# Patient Record
Sex: FEMALE | Race: OTHER | Employment: UNEMPLOYED | URBAN - METROPOLITAN AREA
[De-identification: names, ages, dates, MRNs, and addresses within clinical notes are randomized per-mention and may not be internally consistent; named-entity substitution may affect disease eponyms.]

---

## 2017-02-07 ENCOUNTER — TELEPHONE (OUTPATIENT)
Dept: OBGYN CLINIC | Facility: CLINIC | Age: 32
End: 2017-02-07

## 2017-02-07 NOTE — TELEPHONE ENCOUNTER
PT INFORMED INS. COMING UP INELIGIBLE. INFORMED PT PER MY SUPERVISOR, SHE WOULD NEED TO BE SELF PAY TODAY AND THAT SHE NEEDS TO CONTACT INSURANCE. PT DID NOT WANT TO PAY AND CANCELLED APPOINTMENT. WILL CALL TO R/S ONCE INSURANCE ISSUE IS FIGURED OUT.

## 2018-12-13 ENCOUNTER — OFFICE VISIT (OUTPATIENT)
Dept: OBGYN CLINIC | Facility: CLINIC | Age: 33
End: 2018-12-13

## 2018-12-13 VITALS
BODY MASS INDEX: 22 KG/M2 | WEIGHT: 122.38 LBS | DIASTOLIC BLOOD PRESSURE: 86 MMHG | SYSTOLIC BLOOD PRESSURE: 120 MMHG | HEART RATE: 114 BPM

## 2018-12-13 DIAGNOSIS — Z30.432 ENCOUNTER FOR IUD REMOVAL: Primary | ICD-10-CM

## 2018-12-13 PROCEDURE — 99213 OFFICE O/P EST LOW 20 MIN: CPT | Performed by: OBSTETRICS & GYNECOLOGY

## 2018-12-13 NOTE — PROGRESS NOTES
HPI:   Nnamdi Rivera is a 35year old female who presents for an iud removal, pt counseled extensively, all questions answered.        Wt Readings from Last 6 Encounters:  12/13/18 : 122 lb 6.4 oz (55.5 kg)  12/20/16 : 135 lb (61.2 kg)  11/04/16 : 149 lb auscultation  CARDIO: RRR without murmur  GI: good BS's,no masses, HSM or tenderness  :introitus is normal,scant discharge,cervix is pink,no adnexal masses or tenderness, iud string grasped and iud removed without difficulty   Good hemostasis.   Pt tolera

## 2019-06-10 ENCOUNTER — TELEPHONE (OUTPATIENT)
Dept: OBGYN CLINIC | Facility: CLINIC | Age: 34
End: 2019-06-10

## 2019-06-10 ENCOUNTER — OFFICE VISIT (OUTPATIENT)
Dept: OBGYN CLINIC | Facility: CLINIC | Age: 34
End: 2019-06-10
Payer: MEDICAID

## 2019-06-10 VITALS
SYSTOLIC BLOOD PRESSURE: 106 MMHG | BODY MASS INDEX: 23.39 KG/M2 | WEIGHT: 132 LBS | HEIGHT: 63 IN | DIASTOLIC BLOOD PRESSURE: 62 MMHG

## 2019-06-10 DIAGNOSIS — N92.6 MISSED MENSES: Primary | ICD-10-CM

## 2019-06-10 DIAGNOSIS — Z86.19 HISTORY OF GROUP B STREPTOCOCCUS (GBS) INFECTION: ICD-10-CM

## 2019-06-10 DIAGNOSIS — N91.2 AMENORRHEA: ICD-10-CM

## 2019-06-10 DIAGNOSIS — Z34.82 ENCOUNTER FOR SUPERVISION OF OTHER NORMAL PREGNANCY IN SECOND TRIMESTER: Primary | ICD-10-CM

## 2019-06-10 PROCEDURE — 81025 URINE PREGNANCY TEST: CPT | Performed by: OBSTETRICS & GYNECOLOGY

## 2019-06-10 PROCEDURE — 99214 OFFICE O/P EST MOD 30 MIN: CPT | Performed by: OBSTETRICS & GYNECOLOGY

## 2019-06-10 NOTE — PROGRESS NOTES
HPI:   Clarissa Dillard is a 29year old female who presents for amenorrhea/missed menses/pcv. By lmp edc is 11/19/19, ega 16 6/7 weeks. Pt counseled extensively, pt wants level 1 u/s and quad screen. All quesitons answered.     Hx of gbs bact supple,no adenopathy,no bruits  CHEST: no chest tenderness  LUNGS: clear to auscultation  CARDIO: RRR without murmur  GI: good BS's,no masses, HSM or tenderness, fh 17 cm. fhts 152  :deferred by pt.    MUSCULOSKELETAL: back is not tender,FROM of the back

## 2019-06-10 NOTE — TELEPHONE ENCOUNTER
RN consulted with provider who indicates Level 1 is appropriate for patient. New order entered. Pended for provider review. PA initiated.  Insurance plan is requesting clinical documentation to support request.  Once encounter note completed and signed by

## 2019-06-17 ENCOUNTER — NURSE ONLY (OUTPATIENT)
Dept: OBGYN CLINIC | Facility: CLINIC | Age: 34
End: 2019-06-17
Payer: MEDICAID

## 2019-06-17 DIAGNOSIS — Z34.82 ENCOUNTER FOR SUPERVISION OF OTHER NORMAL PREGNANCY IN SECOND TRIMESTER: Primary | ICD-10-CM

## 2019-06-17 PROCEDURE — 99211 OFF/OP EST MAY X REQ PHY/QHP: CPT | Performed by: OBSTETRICS & GYNECOLOGY

## 2019-06-17 RX ORDER — VITAMIN A ACETATE, BETA CAROTENE, ASCORBIC ACID, CHOLECALCIFEROL, .ALPHA.-TOCOPHEROL ACETATE, DL-, THIAMINE MONONITRATE, RIBOFLAVIN, NIACINAMIDE, PYRIDOXINE HYDROCHLORIDE, FOLIC ACID, CYANOCOBALAMIN, CALCIUM CARBONATE, FERROUS FUMARATE, ZINC OXIDE, CUPRIC OXIDE 3080; 12; 120; 400; 1; 1.84; 3; 20; 22; 920; 25; 200; 27; 10; 2 [IU]/1; UG/1; MG/1; [IU]/1; MG/1; MG/1; MG/1; MG/1; MG/1; [IU]/1; MG/1; MG/1; MG/1; MG/1; MG/1
TABLET, FILM COATED ORAL
COMMUNITY

## 2019-06-17 NOTE — PROGRESS NOTES
OB History     T2    L2    SAB0  TAB0  Ectopic0  Multiple0  Live Births2     Pt is here today for [employer].     Missed menses apt with: ASJ   LMP: 19    Pre  BMI:  21.6  EPDS score:   +UPT at home: 3/2019  +UPT in office:

## 2019-06-18 ENCOUNTER — LAB ENCOUNTER (OUTPATIENT)
Dept: LAB | Facility: HOSPITAL | Age: 34
End: 2019-06-18
Attending: OBSTETRICS & GYNECOLOGY
Payer: MEDICAID

## 2019-06-18 DIAGNOSIS — Z34.82 ENCOUNTER FOR SUPERVISION OF OTHER NORMAL PREGNANCY IN SECOND TRIMESTER: ICD-10-CM

## 2019-06-18 PROCEDURE — 81511 FTL CGEN ABNOR FOUR ANAL: CPT

## 2019-06-18 PROCEDURE — 86762 RUBELLA ANTIBODY: CPT

## 2019-06-18 PROCEDURE — 87389 HIV-1 AG W/HIV-1&-2 AB AG IA: CPT

## 2019-06-18 PROCEDURE — 36415 COLL VENOUS BLD VENIPUNCTURE: CPT

## 2019-06-18 PROCEDURE — 86901 BLOOD TYPING SEROLOGIC RH(D): CPT

## 2019-06-18 PROCEDURE — 86900 BLOOD TYPING SEROLOGIC ABO: CPT

## 2019-06-18 PROCEDURE — 87340 HEPATITIS B SURFACE AG IA: CPT

## 2019-06-18 PROCEDURE — 86780 TREPONEMA PALLIDUM: CPT

## 2019-06-18 PROCEDURE — 81001 URINALYSIS AUTO W/SCOPE: CPT

## 2019-06-18 PROCEDURE — 87086 URINE CULTURE/COLONY COUNT: CPT

## 2019-06-18 PROCEDURE — 86850 RBC ANTIBODY SCREEN: CPT

## 2019-06-18 PROCEDURE — 85025 COMPLETE CBC W/AUTO DIFF WBC: CPT

## 2019-06-20 ENCOUNTER — INITIAL PRENATAL (OUTPATIENT)
Dept: OBGYN CLINIC | Facility: CLINIC | Age: 34
End: 2019-06-20
Payer: MEDICAID

## 2019-06-20 VITALS — WEIGHT: 131 LBS | DIASTOLIC BLOOD PRESSURE: 64 MMHG | BODY MASS INDEX: 23 KG/M2 | SYSTOLIC BLOOD PRESSURE: 110 MMHG

## 2019-06-20 DIAGNOSIS — Z34.91 NORMAL PREGNANCY IN FIRST TRIMESTER: Primary | ICD-10-CM

## 2019-06-20 PROCEDURE — 0500F INITIAL PRENATAL CARE VISIT: CPT | Performed by: OBSTETRICS & GYNECOLOGY

## 2019-06-20 NOTE — PROGRESS NOTES
Good fm. Pt wants ob2 u/s at [facility],  Order entered and pt to schedule now. No c/o.

## 2019-06-26 ENCOUNTER — TELEPHONE (OUTPATIENT)
Dept: OBGYN CLINIC | Facility: CLINIC | Age: 34
End: 2019-06-26

## 2019-06-27 NOTE — TELEPHONE ENCOUNTER
Bermudian phone line  #[phone] used to translate phone call. Informed pt her Quad screen was normal. Pt voices understanding.

## 2019-07-09 NOTE — TELEPHONE ENCOUNTER
Case previously denied. [facility].  Was advised additional clinical information was needed (confirming pregnancy). RN provides needed information and auth obtained.     Auth number: [id number]  Referral upd

## 2019-07-16 ENCOUNTER — HOSPITAL ENCOUNTER (OUTPATIENT)
Dept: ULTRASOUND IMAGING | Age: 34
Discharge: HOME | End: 2019-07-16
Attending: OBSTETRICS & GYNECOLOGY
Payer: MEDICAID

## 2019-07-16 DIAGNOSIS — Z34.91 NORMAL PREGNANCY IN FIRST TRIMESTER: ICD-10-CM

## 2019-07-16 PROCEDURE — 76805 OB US >/= 14 WKS SNGL FETUS: CPT | Performed by: OBSTETRICS & GYNECOLOGY

## 2019-07-25 ENCOUNTER — ROUTINE PRENATAL (OUTPATIENT)
Dept: OBGYN CLINIC | Facility: CLINIC | Age: 34
End: 2019-07-25
Payer: MEDICAID

## 2019-07-25 VITALS — DIASTOLIC BLOOD PRESSURE: 71 MMHG | WEIGHT: 140 LBS | BODY MASS INDEX: 25 KG/M2 | SYSTOLIC BLOOD PRESSURE: 110 MMHG

## 2019-07-25 DIAGNOSIS — Z34.82 ENCOUNTER FOR SUPERVISION OF OTHER NORMAL PREGNANCY IN SECOND TRIMESTER: Primary | ICD-10-CM

## 2019-07-25 PROBLEM — O26.899 RH NEGATIVE STATE IN ANTEPARTUM PERIOD: Status: ACTIVE | Noted: 2019-07-25

## 2019-07-25 PROBLEM — O99.013 ANEMIA DURING PREGNANCY IN THIRD TRIMESTER: Status: ACTIVE | Noted: 2019-07-25

## 2019-07-25 PROBLEM — Z67.91 RH NEGATIVE STATE IN ANTEPARTUM PERIOD: Status: ACTIVE | Noted: 2019-07-25

## 2019-07-25 LAB
APPEARANCE: CLEAR
MULTISTIX LOT#: NORMAL NUMERIC
PH, URINE: 7 (ref 4.5–8)
SPECIFIC GRAVITY: 1.01 (ref 1–1.03)
URINE-COLOR: YELLOW
UROBILINOGEN,SEMI-QN: 0.2 MG/DL (ref 0–1.9)

## 2019-07-25 PROCEDURE — 0502F SUBSEQUENT PRENATAL CARE: CPT | Performed by: OBSTETRICS & GYNECOLOGY

## 2019-07-25 PROCEDURE — 81002 URINALYSIS NONAUTO W/O SCOPE: CPT | Performed by: OBSTETRICS & GYNECOLOGY

## 2019-07-25 RX ORDER — FERROUS SULFATE 325(65) MG
325 TABLET ORAL
Qty: 30 TABLET | Refills: 5 | Status: SHIPPED | OUTPATIENT
Start: 2019-07-25 | End: 2020-07-24

## 2019-08-21 ENCOUNTER — ROUTINE PRENATAL (OUTPATIENT)
Dept: OBGYN CLINIC | Facility: CLINIC | Age: 34
End: 2019-08-21
Payer: MEDICAID

## 2019-08-21 VITALS — BODY MASS INDEX: 26 KG/M2 | SYSTOLIC BLOOD PRESSURE: 110 MMHG | DIASTOLIC BLOOD PRESSURE: 68 MMHG | WEIGHT: 144 LBS

## 2019-08-21 DIAGNOSIS — Z23 NEED FOR VACCINATION: ICD-10-CM

## 2019-08-21 DIAGNOSIS — Z34.83 ENCOUNTER FOR SUPERVISION OF OTHER NORMAL PREGNANCY IN THIRD TRIMESTER: Primary | ICD-10-CM

## 2019-08-21 PROCEDURE — 90471 IMMUNIZATION ADMIN: CPT | Performed by: ADVANCED PRACTICE MIDWIFE

## 2019-08-21 PROCEDURE — 0502F SUBSEQUENT PRENATAL CARE: CPT | Performed by: ADVANCED PRACTICE MIDWIFE

## 2019-08-21 PROCEDURE — 90715 TDAP VACCINE 7 YRS/> IM: CPT | Performed by: ADVANCED PRACTICE MIDWIFE

## 2019-08-21 PROCEDURE — 81002 URINALYSIS NONAUTO W/O SCOPE: CPT | Performed by: ADVANCED PRACTICE MIDWIFE

## 2019-08-21 RX ORDER — BREAST PUMP
EACH MISCELLANEOUS
Qty: 1 EACH | Refills: 0 | Status: SHIPPED | OUTPATIENT
Start: 2019-08-21

## 2019-08-21 NOTE — PROGRESS NOTES
S.. Denies HA, URQ pain, swelling, vision change,  Baby is active its a boy. States has a swelling in her inguinal area that she believes is a varicosity. Is a homemaker. RH negative  O. See above  A   G 3   RH neg  Anemia on iron  P.  Chart review

## 2019-08-22 ENCOUNTER — MED REC SCAN ONLY (OUTPATIENT)
Dept: OBGYN CLINIC | Facility: CLINIC | Age: 34
End: 2019-08-22

## 2019-08-28 ENCOUNTER — LAB ENCOUNTER (OUTPATIENT)
Dept: LAB | Facility: HOSPITAL | Age: 34
End: 2019-08-28
Attending: OBSTETRICS & GYNECOLOGY
Payer: MEDICAID

## 2019-08-28 DIAGNOSIS — Z34.82 ENCOUNTER FOR SUPERVISION OF OTHER NORMAL PREGNANCY IN SECOND TRIMESTER: ICD-10-CM

## 2019-08-28 LAB
ANTIBODY SCREEN: NEGATIVE
BASOPHILS # BLD AUTO: 0.01 X10(3) UL (ref 0–0.2)
BASOPHILS NFR BLD AUTO: 0.1 %
DEPRECATED RDW RBC AUTO: 43.5 FL (ref 35.1–46.3)
EOSINOPHIL # BLD AUTO: 0.16 X10(3) UL (ref 0–0.7)
EOSINOPHIL NFR BLD AUTO: 2.3 %
ERYTHROCYTE [DISTWIDTH] IN BLOOD BY AUTOMATED COUNT: 12.6 % (ref 11–15)
GLUCOSE 1H P GLC SERPL-MCNC: 173 MG/DL
HCT VFR BLD AUTO: 32.3 % (ref 35–48)
HGB BLD-MCNC: 10.8 G/DL (ref 12–16)
IMM GRANULOCYTES # BLD AUTO: 0.08 X10(3) UL (ref 0–1)
IMM GRANULOCYTES NFR BLD: 1.1 %
LYMPHOCYTES # BLD AUTO: 1.24 X10(3) UL (ref 1–4)
LYMPHOCYTES NFR BLD AUTO: 17.5 %
MCH RBC QN AUTO: 32 PG (ref 26–34)
MCHC RBC AUTO-ENTMCNC: 33.4 G/DL (ref 31–37)
MCV RBC AUTO: 95.8 FL (ref 80–100)
MONOCYTES # BLD AUTO: 0.42 X10(3) UL (ref 0.1–1)
MONOCYTES NFR BLD AUTO: 5.9 %
NEUTROPHILS # BLD AUTO: 5.16 X10 (3) UL (ref 1.5–7.7)
NEUTROPHILS # BLD AUTO: 5.16 X10(3) UL (ref 1.5–7.7)
NEUTROPHILS NFR BLD AUTO: 73.1 %
PLATELET # BLD AUTO: 158 10(3)UL (ref 150–450)
RBC # BLD AUTO: 3.37 X10(6)UL (ref 3.8–5.3)
RH BLOOD TYPE: NEGATIVE
WBC # BLD AUTO: 7.1 X10(3) UL (ref 4–11)

## 2019-08-28 PROCEDURE — 36415 COLL VENOUS BLD VENIPUNCTURE: CPT

## 2019-08-28 PROCEDURE — 86850 RBC ANTIBODY SCREEN: CPT

## 2019-08-28 PROCEDURE — 82950 GLUCOSE TEST: CPT

## 2019-08-28 PROCEDURE — 85025 COMPLETE CBC W/AUTO DIFF WBC: CPT

## 2019-08-28 PROCEDURE — 86900 BLOOD TYPING SEROLOGIC ABO: CPT

## 2019-08-28 PROCEDURE — 86901 BLOOD TYPING SEROLOGIC RH(D): CPT

## 2019-08-29 ENCOUNTER — TELEPHONE (OUTPATIENT)
Dept: OBGYN CLINIC | Facility: CLINIC | Age: 34
End: 2019-08-29

## 2019-08-29 NOTE — TELEPHONE ENCOUNTER
----- Message from Daija Lopez MD sent at 8/29/2019  1:32 PM CDT -----  Please inform patient that her she is mildly anemic, that is her hemoglobin is lower than normal range.   It is recommended that she increase her intake of iron rich foods (eg. Red

## 2019-08-29 NOTE — TELEPHONE ENCOUNTER
Informed pt of message below.  Pt voices understanding and schedule appointment for Rhogam injection for tomorrow at 1pm.    ----- Message from Luke Gusman MD sent at 8/29/2019  1:33 PM CDT -----  Please inform that patient is of Rh negative blood type

## 2019-08-29 NOTE — TELEPHONE ENCOUNTER
French phone line  #[phone] used to translate phone call. Informed pt of message below. Lab's number given to pt so she can call and schedule appointment. Pt placed in follow up book.     ----- Message from Chapin Steven MD sent at 8/29/2019

## 2019-08-30 ENCOUNTER — NURSE ONLY (OUTPATIENT)
Dept: OBGYN CLINIC | Facility: CLINIC | Age: 34
End: 2019-08-30
Payer: MEDICAID

## 2019-08-30 VITALS — DIASTOLIC BLOOD PRESSURE: 62 MMHG | SYSTOLIC BLOOD PRESSURE: 102 MMHG

## 2019-08-30 DIAGNOSIS — O26.899 RH NEGATIVE STATE IN ANTEPARTUM PERIOD: Primary | ICD-10-CM

## 2019-08-30 DIAGNOSIS — Z67.91 RH NEGATIVE STATE IN ANTEPARTUM PERIOD: Primary | ICD-10-CM

## 2019-08-30 PROCEDURE — 96372 THER/PROPH/DIAG INJ SC/IM: CPT | Performed by: OBSTETRICS & GYNECOLOGY

## 2019-09-04 ENCOUNTER — ROUTINE PRENATAL (OUTPATIENT)
Dept: OBGYN CLINIC | Facility: CLINIC | Age: 34
End: 2019-09-04
Payer: MEDICAID

## 2019-09-04 VITALS — BODY MASS INDEX: 26 KG/M2 | SYSTOLIC BLOOD PRESSURE: 112 MMHG | WEIGHT: 146.63 LBS | DIASTOLIC BLOOD PRESSURE: 73 MMHG

## 2019-09-04 DIAGNOSIS — Z34.82 ENCOUNTER FOR SUPERVISION OF OTHER NORMAL PREGNANCY IN SECOND TRIMESTER: Primary | ICD-10-CM

## 2019-09-04 LAB
APPEARANCE: CLEAR
MULTISTIX LOT#: NORMAL NUMERIC
PH, URINE: 7 (ref 4.5–8)
SPECIFIC GRAVITY: 1.01 (ref 1–1.03)
UROBILINOGEN,SEMI-QN: 0.2 MG/DL (ref 0–1.9)

## 2019-09-04 PROCEDURE — 0502F SUBSEQUENT PRENATAL CARE: CPT | Performed by: OBSTETRICS & GYNECOLOGY

## 2019-09-04 PROCEDURE — 81003 URINALYSIS AUTO W/O SCOPE: CPT | Performed by: OBSTETRICS & GYNECOLOGY

## 2019-09-06 ENCOUNTER — TELEPHONE (OUTPATIENT)
Dept: OBGYN CLINIC | Facility: CLINIC | Age: 34
End: 2019-09-06

## 2019-09-19 ENCOUNTER — TELEPHONE (OUTPATIENT)
Dept: OBGYN CLINIC | Facility: CLINIC | Age: 34
End: 2019-09-19

## 2019-09-19 ENCOUNTER — ROUTINE PRENATAL (OUTPATIENT)
Dept: OBGYN CLINIC | Facility: CLINIC | Age: 34
End: 2019-09-19
Payer: MEDICAID

## 2019-09-19 ENCOUNTER — LABORATORY ENCOUNTER (OUTPATIENT)
Dept: LAB | Facility: REFERENCE LAB | Age: 34
End: 2019-09-19
Attending: OBSTETRICS & GYNECOLOGY
Payer: MEDICAID

## 2019-09-19 VITALS
WEIGHT: 148 LBS | HEART RATE: 109 BPM | SYSTOLIC BLOOD PRESSURE: 107 MMHG | DIASTOLIC BLOOD PRESSURE: 74 MMHG | BODY MASS INDEX: 26 KG/M2

## 2019-09-19 DIAGNOSIS — Z34.82 ENCOUNTER FOR SUPERVISION OF OTHER NORMAL PREGNANCY IN SECOND TRIMESTER: ICD-10-CM

## 2019-09-19 DIAGNOSIS — R73.09 ELEVATED GLUCOSE TOLERANCE TEST: Primary | ICD-10-CM

## 2019-09-19 DIAGNOSIS — Z34.83 ENCOUNTER FOR SUPERVISION OF OTHER NORMAL PREGNANCY IN THIRD TRIMESTER: Primary | ICD-10-CM

## 2019-09-19 LAB
GLUCOSE P FAST SERPL-MCNC: 98 MG/DL
MULTISTIX LOT#: ABNORMAL NUMERIC
OCCULT BLOOD: 7
SPECIFIC GRAVITY: 1 (ref 1–1.03)
UROBILINOGEN,SEMI-QN: 0.2 MG/DL (ref 0–1.9)

## 2019-09-19 PROCEDURE — 0502F SUBSEQUENT PRENATAL CARE: CPT | Performed by: OBSTETRICS & GYNECOLOGY

## 2019-09-19 PROCEDURE — 81002 URINALYSIS NONAUTO W/O SCOPE: CPT | Performed by: OBSTETRICS & GYNECOLOGY

## 2019-09-19 NOTE — TELEPHONE ENCOUNTER
Call marsha from [facility] 150 lab advising that patient was scheduled for 3hour GTT today. Came in completed fasting draw, but did not return for 1 hour draw.   It has now been nearly 2 hours since fasting draw, phlebotomist wants to confirm that test should be c

## 2019-09-19 NOTE — PROGRESS NOTES
[facility], [facility]  Obstetrics and Gynecology  Prenatal Visit  Elana Macdonald MD    JOE Priest is a 29year old.o.  31w2d weeks. Here for routine prenatal visit and is without complaints.   Patient denies any regular uterine contr

## 2019-09-24 ENCOUNTER — APPOINTMENT (OUTPATIENT)
Dept: LAB | Facility: HOSPITAL | Age: 34
End: 2019-09-24
Attending: OBSTETRICS & GYNECOLOGY
Payer: MEDICAID

## 2019-09-24 ENCOUNTER — LABORATORY ENCOUNTER (OUTPATIENT)
Dept: LAB | Facility: REFERENCE LAB | Age: 34
End: 2019-09-24
Attending: OBSTETRICS & GYNECOLOGY
Payer: MEDICAID

## 2019-09-24 ENCOUNTER — TELEPHONE (OUTPATIENT)
Dept: OBGYN CLINIC | Facility: CLINIC | Age: 34
End: 2019-09-24

## 2019-09-24 DIAGNOSIS — R73.09 ELEVATED GLUCOSE TOLERANCE TEST: Primary | ICD-10-CM

## 2019-09-24 DIAGNOSIS — Z34.83 PRENATAL CARE, SUBSEQUENT PREGNANCY, THIRD TRIMESTER: Primary | ICD-10-CM

## 2019-09-24 PROBLEM — O99.810 GLUCOSE INTOLERANCE OF PREGNANCY: Status: ACTIVE | Noted: 2019-09-24

## 2019-09-24 LAB
1 HR GLUCOSE GESTATIONAL: 128 MG/DL
GLUCOSE 1H P GLC SERPL-MCNC: 144 MG/DL
GLUCOSE 3H P GLC SERPL-MCNC: 128 MG/DL
GLUCOSE P FAST SERPL-MCNC: 103 MG/DL

## 2019-09-24 PROCEDURE — 82952 GTT-ADDED SAMPLES: CPT

## 2019-09-24 PROCEDURE — 82951 GLUCOSE TOLERANCE TEST (GTT): CPT

## 2019-09-24 PROCEDURE — 36415 COLL VENOUS BLD VENIPUNCTURE: CPT

## 2019-09-24 NOTE — TELEPHONE ENCOUNTER
Omani phone line  used to translate phone call. Informed pt of message and Dr. Trinh Burkett message below. Pt voices understanding to this information. Order placed in [facility] for dietary consult. Pt placed in short term follow up book.     aVle Spears

## 2019-10-03 ENCOUNTER — ROUTINE PRENATAL (OUTPATIENT)
Dept: OBGYN CLINIC | Facility: CLINIC | Age: 34
End: 2019-10-03
Payer: MEDICAID

## 2019-10-03 VITALS
SYSTOLIC BLOOD PRESSURE: 113 MMHG | DIASTOLIC BLOOD PRESSURE: 70 MMHG | WEIGHT: 150 LBS | BODY MASS INDEX: 27 KG/M2 | HEART RATE: 105 BPM

## 2019-10-03 DIAGNOSIS — Z34.83 ENCOUNTER FOR SUPERVISION OF OTHER NORMAL PREGNANCY IN THIRD TRIMESTER: Primary | ICD-10-CM

## 2019-10-03 PROCEDURE — 90471 IMMUNIZATION ADMIN: CPT | Performed by: OBSTETRICS & GYNECOLOGY

## 2019-10-03 PROCEDURE — 0502F SUBSEQUENT PRENATAL CARE: CPT | Performed by: OBSTETRICS & GYNECOLOGY

## 2019-10-03 PROCEDURE — 90686 IIV4 VACC NO PRSV 0.5 ML IM: CPT | Performed by: OBSTETRICS & GYNECOLOGY

## 2019-10-03 PROCEDURE — 81002 URINALYSIS NONAUTO W/O SCOPE: CPT | Performed by: OBSTETRICS & GYNECOLOGY

## 2019-10-03 NOTE — PROGRESS NOTES
[facility], [facility]  Obstetrics and Gynecology  Prenatal Visit  MD JOE Marquez   Angelica Cash is a 29year old.o.  33w2d weeks. Here for routine prenatal visit and is without complaints.   Patient denies any regular uterine contr follow-up for routine prenatal visit in 2 weeks.     Daisy Lira MD, MD  2:24 PM  10/3/2019

## 2019-10-04 ENCOUNTER — TELEPHONE (OUTPATIENT)
Dept: ENDOCRINOLOGY | Facility: HOSPITAL | Age: 34
End: 2019-10-04

## 2019-10-04 NOTE — TELEPHONE ENCOUNTER
Talked to pt in regards to missing her apt today. Pt states that she was not aware that she had an appointment today at [address] [place] [address] states she will be by her phone so she can be rescheduled.  pls advise

## 2019-10-08 ENCOUNTER — HOSPITAL ENCOUNTER (OUTPATIENT)
Dept: ENDOCRINOLOGY | Facility: HOSPITAL | Age: 34
Discharge: HOME | End: 2019-10-08
Attending: OBSTETRICS & GYNECOLOGY
Payer: MEDICAID

## 2019-10-08 VITALS — BODY MASS INDEX: 32 KG/M2 | WEIGHT: 180.31 LBS

## 2019-10-08 NOTE — PROGRESS NOTES
Eli Bonds  : 1985 was seen for Gestational Diabetes Counseling: Individual/Group    Date: 10/8/2019   Start time: 130p End time: 230p    Obtained usual diet history:     Patient states that she normally eats 3 meals daily with an ev restrictions. 3. Encouraged Brandi to call diabetes center with any questions or concerns. Patient verbalized understanding and has no further questions at this time.     David Silveira RN

## 2019-10-18 ENCOUNTER — APPOINTMENT (OUTPATIENT)
Dept: ENDOCRINOLOGY | Facility: HOSPITAL | Age: 34
End: 2019-10-18
Attending: OBSTETRICS & GYNECOLOGY
Payer: MEDICAID

## 2019-10-24 ENCOUNTER — LAB ENCOUNTER (OUTPATIENT)
Dept: LAB | Age: 34
End: 2019-10-24
Attending: OBSTETRICS & GYNECOLOGY
Payer: MEDICAID

## 2019-10-24 ENCOUNTER — ROUTINE PRENATAL (OUTPATIENT)
Dept: OBGYN CLINIC | Facility: CLINIC | Age: 34
End: 2019-10-24
Payer: MEDICAID

## 2019-10-24 VITALS
BODY MASS INDEX: 27 KG/M2 | DIASTOLIC BLOOD PRESSURE: 75 MMHG | HEART RATE: 90 BPM | SYSTOLIC BLOOD PRESSURE: 114 MMHG | WEIGHT: 153 LBS

## 2019-10-24 DIAGNOSIS — Z34.83 ENCOUNTER FOR SUPERVISION OF OTHER NORMAL PREGNANCY IN THIRD TRIMESTER: ICD-10-CM

## 2019-10-24 DIAGNOSIS — Z34.83 ENCOUNTER FOR SUPERVISION OF OTHER NORMAL PREGNANCY IN THIRD TRIMESTER: Primary | ICD-10-CM

## 2019-10-24 PROCEDURE — 87389 HIV-1 AG W/HIV-1&-2 AB AG IA: CPT

## 2019-10-24 PROCEDURE — 0502F SUBSEQUENT PRENATAL CARE: CPT | Performed by: OBSTETRICS & GYNECOLOGY

## 2019-10-24 PROCEDURE — 36415 COLL VENOUS BLD VENIPUNCTURE: CPT

## 2019-10-24 PROCEDURE — 86780 TREPONEMA PALLIDUM: CPT

## 2019-10-24 PROCEDURE — 85025 COMPLETE CBC W/AUTO DIFF WBC: CPT

## 2019-10-24 PROCEDURE — 81002 URINALYSIS NONAUTO W/O SCOPE: CPT | Performed by: OBSTETRICS & GYNECOLOGY

## 2019-10-24 NOTE — PROGRESS NOTES
[facility]  Obstetrics and Gynecology  Prenatal Visit  Keena Chen MD    JOE Max is a 29year old.o.  36w2d weeks. Here for routine prenatal visit and is without complaints.   Patient denies any regular uterine contr

## 2019-10-28 ENCOUNTER — TELEPHONE (OUTPATIENT)
Dept: OBGYN CLINIC | Facility: CLINIC | Age: 34
End: 2019-10-28

## 2019-10-28 NOTE — TELEPHONE ENCOUNTER
Filipino phone line  #[phone] used to translate phone call. Informed pt of message and Dr. Elder Magdaleno message below. Pt voices understanding.      ---- Message from Christopher Tristan MD sent at 10/25/2019  5:48 PM CDT -----  Result noted.   Please no

## 2019-10-31 ENCOUNTER — ROUTINE PRENATAL (OUTPATIENT)
Dept: OBGYN CLINIC | Facility: CLINIC | Age: 34
End: 2019-10-31
Payer: MEDICAID

## 2019-10-31 VITALS
SYSTOLIC BLOOD PRESSURE: 110 MMHG | DIASTOLIC BLOOD PRESSURE: 74 MMHG | HEART RATE: 111 BPM | WEIGHT: 152 LBS | BODY MASS INDEX: 27 KG/M2

## 2019-10-31 DIAGNOSIS — Z34.83 ENCOUNTER FOR SUPERVISION OF OTHER NORMAL PREGNANCY IN THIRD TRIMESTER: Primary | ICD-10-CM

## 2019-10-31 PROCEDURE — 81002 URINALYSIS NONAUTO W/O SCOPE: CPT | Performed by: OBSTETRICS & GYNECOLOGY

## 2019-10-31 PROCEDURE — 0502F SUBSEQUENT PRENATAL CARE: CPT | Performed by: OBSTETRICS & GYNECOLOGY

## 2019-10-31 NOTE — PROGRESS NOTES
[facility], [facility]  Obstetrics and Gynecology  Prenatal Visit  MD JOE Ramey   Annia Ellsworth is a 29year old.o.  37w2d weeks. Here for routine prenatal visit and is without complaints.   Patient denies any regular uterine contr

## 2019-11-07 ENCOUNTER — HOSPITAL ENCOUNTER (INPATIENT)
Facility: HOSPITAL | Age: 34
LOS: 2 days | Discharge: HOME | End: 2019-11-09
Attending: OBSTETRICS & GYNECOLOGY | Admitting: OBSTETRICS & GYNECOLOGY
Payer: MEDICAID

## 2019-11-07 ENCOUNTER — TELEPHONE (OUTPATIENT)
Dept: OBGYN CLINIC | Facility: CLINIC | Age: 34
End: 2019-11-07

## 2019-11-07 PROBLEM — Z34.90 PREGNANCY: Status: ACTIVE | Noted: 2019-11-07

## 2019-11-07 PROCEDURE — 0HQ9XZZ REPAIR PERINEUM SKIN, EXTERNAL APPROACH: ICD-10-PCS | Performed by: OBSTETRICS & GYNECOLOGY

## 2019-11-07 PROCEDURE — 3E0334Z INTRODUCTION OF SERUM, TOXOID AND VACCINE INTO PERIPHERAL VEIN, PERCUTANEOUS APPROACH: ICD-10-PCS | Performed by: OBSTETRICS & GYNECOLOGY

## 2019-11-07 PROCEDURE — 59409 OBSTETRICAL CARE: CPT | Performed by: OBSTETRICS & GYNECOLOGY

## 2019-11-07 RX ORDER — TERBUTALINE SULFATE 1 MG/ML
0.25 INJECTION, SOLUTION SUBCUTANEOUS AS NEEDED
Status: DISCONTINUED | OUTPATIENT
Start: 2019-11-07 | End: 2019-11-07 | Stop reason: HOSPADM

## 2019-11-07 RX ORDER — AMMONIA INHALANTS 0.04 G/.3ML
0.3 INHALANT RESPIRATORY (INHALATION) AS NEEDED
Status: DISCONTINUED | OUTPATIENT
Start: 2019-11-07 | End: 2019-11-09

## 2019-11-07 RX ORDER — SODIUM CHLORIDE 0.9 % (FLUSH) 0.9 %
10 SYRINGE (ML) INJECTION AS NEEDED
Status: DISCONTINUED | OUTPATIENT
Start: 2019-11-07 | End: 2019-11-09

## 2019-11-07 RX ORDER — DEXTROSE, SODIUM CHLORIDE, SODIUM LACTATE, POTASSIUM CHLORIDE, AND CALCIUM CHLORIDE 5; .6; .31; .03; .02 G/100ML; G/100ML; G/100ML; G/100ML; G/100ML
INJECTION, SOLUTION INTRAVENOUS CONTINUOUS
Status: DISCONTINUED | OUTPATIENT
Start: 2019-11-07 | End: 2019-11-07 | Stop reason: HOSPADM

## 2019-11-07 RX ORDER — ONDANSETRON 2 MG/ML
4 INJECTION INTRAMUSCULAR; INTRAVENOUS EVERY 6 HOURS PRN
Status: DISCONTINUED | OUTPATIENT
Start: 2019-11-07 | End: 2019-11-09

## 2019-11-07 RX ORDER — DIAPER,BRIEF,INFANT-TODD,DISP
1 EACH MISCELLANEOUS EVERY 6 HOURS PRN
Status: DISCONTINUED | OUTPATIENT
Start: 2019-11-07 | End: 2019-11-09

## 2019-11-07 RX ORDER — SIMETHICONE 80 MG
80 TABLET,CHEWABLE ORAL 3 TIMES DAILY PRN
Status: DISCONTINUED | OUTPATIENT
Start: 2019-11-07 | End: 2019-11-09

## 2019-11-07 RX ORDER — IBUPROFEN 600 MG/1
600 TABLET ORAL EVERY 6 HOURS PRN
Status: DISCONTINUED | OUTPATIENT
Start: 2019-11-07 | End: 2019-11-09

## 2019-11-07 RX ORDER — IBUPROFEN 600 MG/1
600 TABLET ORAL ONCE AS NEEDED
Status: DISCONTINUED | OUTPATIENT
Start: 2019-11-07 | End: 2019-11-07 | Stop reason: HOSPADM

## 2019-11-07 RX ORDER — SODIUM CHLORIDE, SODIUM LACTATE, POTASSIUM CHLORIDE, CALCIUM CHLORIDE 600; 310; 30; 20 MG/100ML; MG/100ML; MG/100ML; MG/100ML
INJECTION, SOLUTION INTRAVENOUS CONTINUOUS
Status: DISCONTINUED | OUTPATIENT
Start: 2019-11-07 | End: 2019-11-07 | Stop reason: HOSPADM

## 2019-11-07 RX ORDER — BISACODYL 10 MG
10 SUPPOSITORY, RECTAL RECTAL ONCE AS NEEDED
Status: DISCONTINUED | OUTPATIENT
Start: 2019-11-07 | End: 2019-11-09

## 2019-11-07 RX ORDER — TRISODIUM CITRATE DIHYDRATE AND CITRIC ACID MONOHYDRATE 500; 334 MG/5ML; MG/5ML
30 SOLUTION ORAL AS NEEDED
Status: DISCONTINUED | OUTPATIENT
Start: 2019-11-07 | End: 2019-11-07 | Stop reason: HOSPADM

## 2019-11-07 RX ORDER — CHOLECALCIFEROL (VITAMIN D3) 25 MCG
1 TABLET,CHEWABLE ORAL DAILY
Status: DISCONTINUED | OUTPATIENT
Start: 2019-11-07 | End: 2019-11-09

## 2019-11-07 RX ORDER — DOCUSATE SODIUM 100 MG/1
100 CAPSULE, LIQUID FILLED ORAL 2 TIMES DAILY
Status: DISCONTINUED | OUTPATIENT
Start: 2019-11-07 | End: 2019-11-09

## 2019-11-07 RX ORDER — SODIUM CHLORIDE 0.9 % (FLUSH) 0.9 %
10 SYRINGE (ML) INJECTION AS NEEDED
Status: DISCONTINUED | OUTPATIENT
Start: 2019-11-07 | End: 2019-11-07 | Stop reason: HOSPADM

## 2019-11-07 RX ORDER — AMMONIA INHALANTS 0.04 G/.3ML
0.3 INHALANT RESPIRATORY (INHALATION) AS NEEDED
Status: DISCONTINUED | OUTPATIENT
Start: 2019-11-07 | End: 2019-11-07 | Stop reason: HOSPADM

## 2019-11-07 RX ORDER — LIDOCAINE HYDROCHLORIDE 10 MG/ML
30 INJECTION, SOLUTION EPIDURAL; INFILTRATION; INTRACAUDAL; PERINEURAL ONCE
Status: DISCONTINUED | OUTPATIENT
Start: 2019-11-07 | End: 2019-11-07 | Stop reason: HOSPADM

## 2019-11-07 RX ORDER — LIDOCAINE HYDROCHLORIDE 10 MG/ML
INJECTION, SOLUTION EPIDURAL; INFILTRATION; INTRACAUDAL; PERINEURAL
Status: DISPENSED
Start: 2019-11-07 | End: 2019-11-07

## 2019-11-07 RX ORDER — OXYTOCIN 10 [USP'U]/ML
INJECTION, SOLUTION INTRAMUSCULAR; INTRAVENOUS
Status: DISCONTINUED
Start: 2019-11-07 | End: 2019-11-07 | Stop reason: WASHOUT

## 2019-11-07 NOTE — PROGRESS NOTES
S; r/o labor and ROM. B;  @ 38.2 wks gest. +FM , nkda. A; efms applied, oriented, assessed. Will perform nst, sve, spec for rom.   R; pt agrees

## 2019-11-07 NOTE — PROGRESS NOTES
Received patient into room 363 via [facility]. Bedside shift report received from [facility]. Pt transferred to bed. Bed in lowest and locked position. Side rails up x2. VSS. IV site unremarkable.  Baby present in open crib.  ID bands matched. Jay Jay Liu and family

## 2019-11-07 NOTE — L&D DELIVERY NOTE
[facility] [place] [facility]    Vaginal Delivery Note    Cooper Wade Patient Status:  Inpatient    1985 MRN [id number]   Location [address]  Attending Mitch Elias MD   Hosp Day # 0 PCP No primary care p

## 2019-11-07 NOTE — PROGRESS NOTES
Patient up to bathroom with assist x 2. Voided 200. Patient transferred to mother/baby room 363 per wheelchair in stable condition with baby and personal belongings. Accompanied by significant other and staff. Report given to Patel mother/baby RN.

## 2019-11-07 NOTE — H&P
[facility] Patient Status:  Observation    1985 MRN [id number]   Location [address] [place] [address] Attending Jan Diaz MD   Hosp Day # 0 PCP No primary care pr by mouth daily with breakfast., Disp: 30 tablet, Rfl: 5, Taking  Prenatal Vit-Fe Fumarate-FA (PRENATAL PLUS) 27-1 MG Oral Tab, Take by mouth., Disp: , Rfl: , Taking        Review of Systems:   As documented in HPI        Physical Exam:   Temp:  [98.5 °F (3 detail. All questions answered, all appropriate consents will be signed at the Hospital. Admission is planned for today. Vitals per routine. IVF.  NPO.  EFM. Consents. CBC, T & S. Anticipate . Sarah Garcia  2019  10:18 AM

## 2019-11-07 NOTE — PROGRESS NOTES
S;  Pt admitted for labor  B;  + Nitz, neg fern. sve 5cm vertex feel fetal scalp/hair. fhts 150s min. Variability w/earlys,   A; l. Lateral position, md notified of efsm. Sve, ROM.    R admitted,

## 2019-11-07 NOTE — TELEPHONE ENCOUNTER
OB History     T2    L2    SAB0  TAB0  Ectopic0  Multiple0  Live Births2    GA: 38w 2d    Regular contractions x 2 hours 5 mins apart with significant vaginal bleeding. Pt advised to report to [facility] for evaluation.     Provider and triage notifie

## 2019-11-07 NOTE — PAYOR COMM NOTE
--------------  ADMISSION REVIEW     Payor: Nilay Leon #:  [id number]  Authorization Number: [id number]    Admit date: 11/7/19  Admit time: 2015 [facility]       Admitting Physician: Nola Nichols MD  Attending Physicia Past Medical History:   Diagnosis Date   • Anemia during pregnancy in third trimester 7/25/2019     Past Social History: History reviewed. No pertinent surgical history.   Family History:   Family History   Problem Relation Age of Onset   • Lipids Father Assessment/Plan:   Assessment:  Problems: Patient Active Problem List:     Encounter for supervision of normal pregnancy in second trimester     Anemia during pregnancy in third trimester     Rh negative state in antepartum period     Glucose intole Cord Blood Collection: no  Cord Tissue Collection: no  Cord Complications: none  Sponge and Needle Counts:  Verified yes     Maternal Anesthesia: local   Episiotomy/Laceration Repair  Episiotomy: none  Laceration: perineal 1st degree     Delivery Complicat

## 2019-11-08 RX ORDER — IBUPROFEN 600 MG/1
600 TABLET ORAL EVERY 6 HOURS PRN
Qty: 30 TABLET | Refills: 0 | Status: SHIPPED | OUTPATIENT
Start: 2019-11-08

## 2019-11-08 NOTE — PLAN OF CARE
Problem: BIRTH - VAGINAL/ SECTION  Goal: Fetal and maternal status remain reassuring during the birth process  Description  INTERVENTIONS:  - Monitor vital signs  - Monitor fetal heart rate  - Monitor uterine activity  - Monitor labor progression choices  Outcome: Completed     Problem: POSTPARTUM  Goal: Long Term Goal:Experiences normal postpartum course  Description  INTERVENTIONS:  - Assess and monitor vital signs and lab values. - Assess fundus and lochia.   - Provide ice/sitz baths for perineu monitor for signs of nipple pain/trauma. - Instruct and provide assistance with proper latch. - Review techniques for milk expression (breast pumping) and storage of breast milk. Provide pumping equipment/supplies, instructions and assistance, as needed.

## 2019-11-08 NOTE — LACTATION NOTE
LACTATION NOTE - MOTHER      Evaluation Type: Inpatient         Maternal history  Maternal history: Anemia  Other/comment: thrombocytopenia    Breastfeeding goal  Breastfeeding goal: To maintain breast milk feeding per patient goal    Maternal Assessment
LACTATION NOTE - MOTHER      Evaluation Type: Inpatient    Problems identified  Problems identified: Knowledge deficit;Milk supply WNL    Maternal history  Other/comment: thrombocytopenia    Breastfeeding goal  Breastfeeding goal: To maintain breast milk f
This note was copied from a baby's chart.   LACTATION NOTE - INFANT    Evaluation Type  Evaluation Type: Inpatient    Problems & Assessment  Problems Diagnosed or Identified: Latch difficulty  Infant Assessment: Skin color: pink or appropriate for ethnicity
This note was copied from a baby's chart. LACTATION NOTE - INFANT    Evaluation Type  Evaluation Type: Inpatient    Problems & Assessment  Infant Assessment: Skin color: pink or appropriate for ethnicity; Minimal hunger cues present  Muscle tone: Appropria
no

## 2019-11-08 NOTE — DISCHARGE SUMMARY
[facility]    Discharge Summary/Discharge Note    Tahmina Solizome Patient Status:  Inpatient    1985 MRN [id number]   Location [facility] 3SE Attending Jae Tomlinson MD   Hosp Day # 1 PCP No primary care provid UASA 20  (A) 2019       Assessment/Plan     A: 29 y.o. [id number] S/P     Discussed with:  nurse     patient and spouse     Plan discussed with patient who verbalizes understanding and agreement.     Hospital Course:   Date of Admission: 2019  D weeks and no driving for one week. As tolerated. Follow up: Follow-up [address] [facility]. Why:  As needed  Contact information:  [address]   [address] [place] [address]           Jabari Garcia

## 2019-11-09 VITALS
DIASTOLIC BLOOD PRESSURE: 70 MMHG | HEART RATE: 110 BPM | WEIGHT: 152 LBS | SYSTOLIC BLOOD PRESSURE: 116 MMHG | BODY MASS INDEX: 26.93 KG/M2 | RESPIRATION RATE: 16 BRPM | TEMPERATURE: 99 F | HEIGHT: 63 IN

## 2019-11-09 NOTE — PROGRESS NOTES
PPD #2. Patient with no C/Os. Stayed yesterday because baby had elevated bilirubin. Patient states Pediatrician was here this morning and baby discharged. VSS  No change on exam    A)  PPD #2. S/P   P)  Discharge to home.   Motrin already sen

## 2019-11-09 NOTE — PLAN OF CARE
Problem: POSTPARTUM  Goal: Long Term Goal:Experiences normal postpartum course  Description  INTERVENTIONS:  - Assess and monitor vital signs and lab values. - Assess fundus and lochia. - Provide ice/sitz baths for perineum discomfort.   - Monitor heali pain/trauma. - Instruct and provide assistance with proper latch. - Review techniques for milk expression (breast pumping) and storage of breast milk. Provide pumping equipment/supplies, instructions and assistance, as needed.   - Encourage rooming-in and as indicated. Ensure aseptic care of all intravenous lines and invasive tubes/drains.  - Obtain immunization and exposure to communicable diseases history.   Outcome: Progressing  Goal: Optimize infant feeding at the breast  Description  INTERVENTIONS:  - I it is safe to breastfeed infant. Outcome: Progressing  Goal: Appropriate maternal -  bonding  Description  INTERVENTIONS:  - Assess caregiver- interactions. - Assess caregiver's emotional status and coping mechanisms.   - Encourage caregiver

## 2019-12-17 ENCOUNTER — POSTPARTUM (OUTPATIENT)
Dept: OBGYN CLINIC | Facility: CLINIC | Age: 34
End: 2019-12-17
Payer: MEDICAID

## 2019-12-17 VITALS — WEIGHT: 130 LBS | DIASTOLIC BLOOD PRESSURE: 60 MMHG | BODY MASS INDEX: 23 KG/M2 | SYSTOLIC BLOOD PRESSURE: 110 MMHG

## 2019-12-17 PROCEDURE — 0503F POSTPARTUM CARE VISIT: CPT | Performed by: OBSTETRICS & GYNECOLOGY

## 2019-12-17 NOTE — PROGRESS NOTES
HPI:    Patient ID: Jacqui Priest is a 29year old female. Patient here for postpartum exam.  No C/Os. Baby boy was breast feeding but now bottle feeding. Patient declines contraception. Partner to use condoms.         Review of Systems encounter.       Meds This Visit:  Requested Prescriptions      No prescriptions requested or ordered in this encounter       Imaging & Referrals:  None       NM#[id number]

## (undated) NOTE — LETTER
2019              Brandi Torres        [address]        [place] [address]         To Whom It May Concern,    Please be advised that Lakeisha Cooper (: 1985) is a patient under my care.  It is ok for patient to have a 3D ultras

## (undated) NOTE — LETTER
VACCINE ADMINISTRATION RECORD  PARENT / GUARDIAN APPROVAL  Date: 2019  Vaccine administered to: Indira Strong     : 1985    MRN: [id number]    A copy of the appropriate Centers for Disease Control and Prevention Vaccine Information statement

## (undated) NOTE — LETTER
6/27/2019              Watson Jones        [address]        [place] [address]         Dear Lia Romero,    It was a pleasure to see you.   Your recent pap test and screening for gonorrhea and chlamydia were normal.  There is no need for further